# Patient Record
Sex: MALE | Race: WHITE | NOT HISPANIC OR LATINO | Employment: FULL TIME | URBAN - METROPOLITAN AREA
[De-identification: names, ages, dates, MRNs, and addresses within clinical notes are randomized per-mention and may not be internally consistent; named-entity substitution may affect disease eponyms.]

---

## 2023-02-16 ENCOUNTER — APPOINTMENT (OUTPATIENT)
Dept: MEDICAL GROUP | Facility: MEDICAL CENTER | Age: 58
End: 2023-02-16
Payer: OTHER GOVERNMENT

## 2023-02-22 ENCOUNTER — APPOINTMENT (OUTPATIENT)
Dept: MEDICAL GROUP | Facility: MEDICAL CENTER | Age: 58
End: 2023-02-22
Payer: OTHER GOVERNMENT

## 2023-04-13 ENCOUNTER — OFFICE VISIT (OUTPATIENT)
Dept: MEDICAL GROUP | Facility: PHYSICIAN GROUP | Age: 58
End: 2023-04-13
Payer: OTHER GOVERNMENT

## 2023-04-13 VITALS
HEIGHT: 68 IN | SYSTOLIC BLOOD PRESSURE: 110 MMHG | OXYGEN SATURATION: 98 % | DIASTOLIC BLOOD PRESSURE: 54 MMHG | WEIGHT: 194.2 LBS | RESPIRATION RATE: 16 BRPM | BODY MASS INDEX: 29.43 KG/M2 | HEART RATE: 54 BPM | TEMPERATURE: 98 F

## 2023-04-13 DIAGNOSIS — I10 ESSENTIAL HYPERTENSION: ICD-10-CM

## 2023-04-13 DIAGNOSIS — Z23 NEED FOR VACCINATION: ICD-10-CM

## 2023-04-13 DIAGNOSIS — E66.3 OVERWEIGHT: ICD-10-CM

## 2023-04-13 DIAGNOSIS — E11.9 TYPE 2 DIABETES MELLITUS WITHOUT COMPLICATION, WITHOUT LONG-TERM CURRENT USE OF INSULIN (HCC): ICD-10-CM

## 2023-04-13 DIAGNOSIS — G25.81 RESTLESS LEG SYNDROME: ICD-10-CM

## 2023-04-13 DIAGNOSIS — I48.0 PAROXYSMAL A-FIB (HCC): ICD-10-CM

## 2023-04-13 DIAGNOSIS — E78.5 DYSLIPIDEMIA: ICD-10-CM

## 2023-04-13 DIAGNOSIS — Z12.5 SCREENING FOR MALIGNANT NEOPLASM OF PROSTATE: ICD-10-CM

## 2023-04-13 DIAGNOSIS — Z11.59 NEED FOR HEPATITIS C SCREENING TEST: ICD-10-CM

## 2023-04-13 LAB — RETINAL SCREEN: NEGATIVE

## 2023-04-13 PROCEDURE — 92250 FUNDUS PHOTOGRAPHY W/I&R: CPT | Performed by: INTERNAL MEDICINE

## 2023-04-13 PROCEDURE — 90677 PCV20 VACCINE IM: CPT | Performed by: INTERNAL MEDICINE

## 2023-04-13 PROCEDURE — 93000 ELECTROCARDIOGRAM COMPLETE: CPT | Performed by: INTERNAL MEDICINE

## 2023-04-13 PROCEDURE — 99205 OFFICE O/P NEW HI 60 MIN: CPT | Mod: 25 | Performed by: INTERNAL MEDICINE

## 2023-04-13 PROCEDURE — 90471 IMMUNIZATION ADMIN: CPT | Performed by: INTERNAL MEDICINE

## 2023-04-13 RX ORDER — METOPROLOL SUCCINATE 25 MG/1
25 TABLET, EXTENDED RELEASE ORAL DAILY
COMMUNITY
End: 2023-04-25 | Stop reason: SDUPTHER

## 2023-04-13 RX ORDER — LISINOPRIL 10 MG/1
10 TABLET ORAL DAILY
COMMUNITY
End: 2023-04-25 | Stop reason: SDUPTHER

## 2023-04-13 RX ORDER — DILTIAZEM HYDROCHLORIDE 240 MG/1
240 CAPSULE, EXTENDED RELEASE ORAL DAILY
COMMUNITY
Start: 2023-03-09 | End: 2023-04-25 | Stop reason: SDUPTHER

## 2023-04-13 RX ORDER — PIOGLITAZONEHYDROCHLORIDE 30 MG/1
30 TABLET ORAL DAILY
COMMUNITY
Start: 2023-03-09 | End: 2023-04-25 | Stop reason: SDUPTHER

## 2023-04-13 RX ORDER — ROPINIROLE 0.5 MG/1
0.5 TABLET, FILM COATED ORAL 3 TIMES DAILY
COMMUNITY
End: 2023-04-25 | Stop reason: SDUPTHER

## 2023-04-13 ASSESSMENT — PATIENT HEALTH QUESTIONNAIRE - PHQ9: CLINICAL INTERPRETATION OF PHQ2 SCORE: 0

## 2023-04-13 NOTE — ASSESSMENT & PLAN NOTE
Chronic condition.    Body mass index is 29.53 kg/m².     Counseling on health consequences related to obesity.  Discussed with the patient regarding diet, exercise, and lifestyle modification to help achieve and maintain healthy weight

## 2023-04-13 NOTE — ASSESSMENT & PLAN NOTE
This is a chronic condition.  Patient stated that due to elevated liver enzymes statin were not recommended.  Patient currently on diet therapy

## 2023-04-13 NOTE — ASSESSMENT & PLAN NOTE
This is a chronic condition.  The patient is taking aspirin 81 mg daily   Patient currently asymptomatic.  Patient denies chest pain shortness of breath palpitation no near syncope

## 2023-04-13 NOTE — ASSESSMENT & PLAN NOTE
Chronic ongoing condition.  The patient is taking metformin 500 mg twice daily, Actos 30 mg daily.  Patient denies significant hypo or hyperglycemic symptoms.  Patient stated that the last A1c done over a year ago was approximately 7%.  Lab test requested for follow-up.

## 2023-04-13 NOTE — ASSESSMENT & PLAN NOTE
Chronic ongoing condition.  The patient taking Requip 0.5 mg 2 tablet at bedtime and an additional tablet as needed.  Stable

## 2023-04-13 NOTE — ASSESSMENT & PLAN NOTE
Chronic ongoing condition.  The patient taking diltiazem 240 Mg daily lisinopril 10 mg daily.  Metoprolol 25 mg daily.  No significant side effects reported.  Patient reported that her blood pressure has been well controlled at home

## 2023-04-13 NOTE — PROGRESS NOTES
PRIMARY CARE CLINIC VISIT    Chief complaint:  New patient here today to establish care  Follow-up diabetes  Atrial fibrillation  Hypertension  Hyperlipidemia  Overweight  Restless leg syndrome  Request lab tests  Cardiology referral  Immunization update        History of Present Illness     Overweight  Chronic condition.    Body mass index is 29.53 kg/m².     Counseling on health consequences related to obesity.  Discussed with the patient regarding diet, exercise, and lifestyle modification to help achieve and maintain healthy weight          Type 2 diabetes mellitus, without long-term current use of insulin (HCC)  Chronic ongoing condition.  The patient is taking metformin 500 mg twice daily, Actos 30 mg daily.  Patient denies significant hypo or hyperglycemic symptoms.  Patient stated that the last A1c done over a year ago was approximately 7%.  Lab test requested for follow-up.    Paroxysmal A-fib (HCC)  This is a chronic condition.  The patient is taking aspirin 81 mg daily   Patient currently asymptomatic.  Patient denies chest pain shortness of breath palpitation no near syncope    Essential hypertension  Chronic ongoing condition.  The patient taking diltiazem 240 Mg daily lisinopril 10 mg daily.  Metoprolol 25 mg daily.  No significant side effects reported.  Patient reported that her blood pressure has been well controlled at home    Restless leg syndrome  Chronic ongoing condition.  The patient taking Requip 0.5 mg 2 tablet at bedtime and an additional tablet as needed.  Stable    Dyslipidemia  This is a chronic condition.  Patient stated that due to elevated liver enzymes statin were not recommended.  Patient currently on diet therapy    Current Outpatient Medications on File Prior to Visit   Medication Sig Dispense Refill    diltiazem (DILACOR XR) 240 MG XR capsule 240 mg every day.      pioglitazone (ACTOS) 30 MG Tab Take 30 mg by mouth every day.      metFORMIN (GLUCOPHAGE) 500 MG Tab Take 500 mg by  "mouth 2 times a day with meals.      lisinopril (PRINIVIL) 10 MG Tab Take 10 mg by mouth every day.      ROPINIRole (REQUIP) 0.5 MG Tab Take 0.5 mg by mouth 3 times a day.      metoprolol SR (TOPROL XL) 25 MG TABLET SR 24 HR Take 25 mg by mouth every day.      aspirin EC (ECOTRIN) 81 MG Tablet Delayed Response Take 81 mg by mouth every day.       No current facility-administered medications on file prior to visit.        Allergies: Patient has no allergy information on record.    Current Outpatient Medications Ordered in Epic   Medication Sig Dispense Refill    diltiazem (DILACOR XR) 240 MG XR capsule 240 mg every day.      pioglitazone (ACTOS) 30 MG Tab Take 30 mg by mouth every day.      metFORMIN (GLUCOPHAGE) 500 MG Tab Take 500 mg by mouth 2 times a day with meals.      lisinopril (PRINIVIL) 10 MG Tab Take 10 mg by mouth every day.      ROPINIRole (REQUIP) 0.5 MG Tab Take 0.5 mg by mouth 3 times a day.      metoprolol SR (TOPROL XL) 25 MG TABLET SR 24 HR Take 25 mg by mouth every day.      aspirin EC (ECOTRIN) 81 MG Tablet Delayed Response Take 81 mg by mouth every day.       No current [facility]-ordered facility-administered medications on file.       History reviewed. No pertinent past medical history.    Past Surgical History:   Procedure Laterality Date    SHOULDER ARTHROSCOPY Right     TONSILLECTOMY         Family History   Problem Relation Age of Onset    Hypertension Mother        Social History     Tobacco Use   Smoking Status Former    Types: Cigarettes   Smokeless Tobacco Never       Social History     Substance and Sexual Activity   Alcohol Use Yes       Review of systems.  As per HPI above. All other systems reviewed and negative.      Past Medical, Social, and Family history reviewed and updated in EPIC     Objective     /54 (BP Location: Left arm, Patient Position: Sitting, BP Cuff Size: Large adult)   Pulse (!) 54   Temp 36.7 °C (98 °F) (Temporal)   Resp 16   Ht 1.727 m (5' 8\")   Wt 88.1 kg " (194 lb 3.2 oz)   SpO2 98%    Body mass index is 29.53 kg/m².    General: alert in no apparent distress.  Cardiovascular: regular rate and rhythm  Pulmonary: lungs : no wheezing   Gastrointestinal: BS present. No obvious mass noted  Monofilament testing with a 10 gram force: sensation intact: intact bilaterally  Visual Inspection: Feet without maceration, ulcers, fissures.  Pedal pulses: decreased bilaterally       Assessment and Plan     1. Type 2 diabetes mellitus without complication, without long-term current use of insulin (HCC)  Chronic condition.  Current control unclear.  Lab test including A1c requested.  Recommend follow-up after lab test done.  Patient will continue with metformin 5 mg twice daily Actos 30 mg daily.  A1c goal of 7% discussed.  Basic physiology of Diabetes was explained to patient as well as possible microvascular/macrovascular complications.  Pt's education:   The importance of increasing physical activity to improve diabetes control was discussed with the patient.   Patient was also educated on changing diet and making better choices to help control blood sugar.   Discussed FBG goal of 100-120, 2hr PP <180       - HEMOGLOBIN A1C; Future  - Basic Metabolic Panel; Future  - MICROALBUMIN CREAT RATIO URINE; Future  - POCT Retinal Eye Exam    2. Paroxysmal A-fib (HCC)  - I independently interpreted the patient's ekg: This rhythm.  Rate 60.  Borderline low voltage otherwise normal EKG.  Result discussed with the patient.  - REFERRAL TO CARDIOLOGY  Chronic condition.  Patient is medically stable.  Continue with metoprolol 25 mg daily and diltiazem 240 Mg daily.  Patient reported that previous cardiologist did not recommend anticoagulation therapy.  He has been taking aspirin 81 mg daily.  We will refer the patient to cardiology for ongoing follow-up  3. Dyslipidemia  - Lipid Profile; Future  - HEPATIC FUNCTION PANEL; Future    4. Essential hypertension  - CBC WITHOUT DIFFERENTIAL; Future    5.  Overweight    6. Restless leg syndrome    7. Need for hepatitis C screening test  - HEP C VIRUS ANTIBODY; Future    8. Screening for malignant neoplasm of prostate  - PROSTATE SPECIFIC AG SCREENING; Future    9. Need for vaccination  - Pneumococcal Conjugate Vaccine 20-Valent (19 yrs+)    Other orders  - diltiazem (DILACOR XR) 240 MG XR capsule; 240 mg every day.  - pioglitazone (ACTOS) 30 MG Tab; Take 30 mg by mouth every day.  - metFORMIN (GLUCOPHAGE) 500 MG Tab; Take 500 mg by mouth 2 times a day with meals.  - lisinopril (PRINIVIL) 10 MG Tab; Take 10 mg by mouth every day.  - aspirin EC (ECOTRIN) 81 MG Tablet Delayed Response; Take 81 mg by mouth every day.  - ROPINIRole (REQUIP) 0.5 MG Tab; Take 0.5 mg by mouth 3 times a day.  - metoprolol SR (TOPROL XL) 25 MG TABLET SR 24 HR; Take 25 mg by mouth every day.      Attestation: I spent:  63   min -  That includes time for chart review before the visit, the actual patient visit, and time spent on documentation in EMR after the visit.  Chart review/prep, review of other providers' records, imaging/lab review, face-to-face time for history/examination, ordering, prescribing,  review of results/meds/ treatment plan with patient, and care coordination.    The patient is of extensive complexity. This pt is at high risk for complications and morbidity.                 Healthcare Maintenance       Health Maintenance Due   Topic Date Due    IMM HEP B VACCINE (1 of 3 - 3-dose series) Never done    HEPATITIS C SCREENING  Never done    A1C SCREENING  Never done    FASTING LIPID PROFILE  Never done    URINE ACR / MICROALBUMIN  Never done    IMM PNEUMOCOCCAL VACCINE: 0-64 Years (1 - PCV) Never done    RETINAL SCREENING  Never done    SERUM CREATININE  Never done    IMM DTaP/Tdap/Td Vaccine (1 - Tdap) Never done    DIABETES MONOFILAMENT / LE EXAM  06/13/2019               Please note that this dictation was created using voice recognition software. I have made every reasonable  attempt to correct obvious errors, but I expect that there are errors of grammar and possibly content that I did not discover before finalizing the note.    Ryan Garnica MD  Internal Medicine  [facility]

## 2023-04-19 ENCOUNTER — TELEPHONE (OUTPATIENT)
Dept: HEALTH INFORMATION MANAGEMENT | Facility: OTHER | Age: 58
End: 2023-04-19
Payer: OTHER GOVERNMENT

## 2023-04-20 ENCOUNTER — OFFICE VISIT (OUTPATIENT)
Dept: CARDIOLOGY | Facility: MEDICAL CENTER | Age: 58
End: 2023-04-20
Attending: INTERNAL MEDICINE
Payer: OTHER GOVERNMENT

## 2023-04-20 VITALS
BODY MASS INDEX: 29.58 KG/M2 | SYSTOLIC BLOOD PRESSURE: 124 MMHG | OXYGEN SATURATION: 98 % | DIASTOLIC BLOOD PRESSURE: 70 MMHG | HEIGHT: 68 IN | HEART RATE: 89 BPM | WEIGHT: 195.16 LBS | RESPIRATION RATE: 16 BRPM

## 2023-04-20 DIAGNOSIS — I10 ESSENTIAL HYPERTENSION: ICD-10-CM

## 2023-04-20 DIAGNOSIS — I48.0 PAROXYSMAL A-FIB (HCC): ICD-10-CM

## 2023-04-20 DIAGNOSIS — E11.9 TYPE 2 DIABETES MELLITUS WITHOUT COMPLICATION, WITHOUT LONG-TERM CURRENT USE OF INSULIN (HCC): ICD-10-CM

## 2023-04-20 PROCEDURE — 99204 OFFICE O/P NEW MOD 45 MIN: CPT | Performed by: STUDENT IN AN ORGANIZED HEALTH CARE EDUCATION/TRAINING PROGRAM

## 2023-04-20 ASSESSMENT — ENCOUNTER SYMPTOMS
PND: 0
DIARRHEA: 0
DYSPNEA ON EXERTION: 0
NIGHT SWEATS: 0
VOMITING: 0
FEVER: 0
WEAKNESS: 0
FOCAL WEAKNESS: 0
SYNCOPE: 0
NAUSEA: 0
NEAR-SYNCOPE: 0
ORTHOPNEA: 0
WHEEZING: 0
ABDOMINAL PAIN: 0
SHORTNESS OF BREATH: 0
COUGH: 0
DIZZINESS: 0
PALPITATIONS: 0
IRREGULAR HEARTBEAT: 0

## 2023-04-20 NOTE — PROGRESS NOTES
Cardiology Initial Consultation Note    Date of note:    4/20/2023    Primary Care Provider: Ryan Garnica M.D.  Referring Provider: Ryan Garnica M.D.     Patient Name: Arben Torres     YOB: 1965  MRN:              [id number]    Chief Complaint: Atrial fibrillation    History of Present Illness: Mr. Arben Torres is a 58 y.o. male whose current medical problems include atrial fibrillation, hypertension, dyslipidemia, and DM who is here for cardiac consultation for atrial fibrillation.      The patient presents today to establish care. The patient presents with his wife.  The patient reports that about five years ago, he was diagnosed with afib.  He woke up with palpitations, and went to the ER, and received diltiazem with conversion to sinus rhythm. The patient reports that he followed with a cardiologist and had been taking diltiazem and metoprolol since without any recurrent symptoms.  He reports feeling very well today. He is active, walks daily and exercises without any chest pain or shortness of breath. No orthopnea, PND, or leg swelling.  No palpitations.  No syncope or presyncopal episodes.     Cardiovascular Risk Factors:  1. Smoking status: Former smoker  2. Type II Diabetes Mellitus: On medication   3. Hypertension: On medication  4. Dyslipidemia: None/not recently checked.   5. Family history of early Coronary Artery Disease in a first degree relative (Male less than 55 years of age; Female less than 65 years of age): None  6.  Obesity and/or Metabolic Syndrome: Body mass index is 29.67 kg/m².  7. Sedentary lifestyle: Active    Review of Systems   Constitutional: Negative for fever, malaise/fatigue and night sweats.   Cardiovascular:  Negative for chest pain, dyspnea on exertion, irregular heartbeat, leg swelling, near-syncope, orthopnea, palpitations, paroxysmal nocturnal dyspnea and syncope.   Respiratory:  Negative for cough, shortness of breath and wheezing.   "  Gastrointestinal:  Negative for abdominal pain, diarrhea, nausea and vomiting.   Neurological:  Negative for dizziness, focal weakness and weakness.       All other systems reviewed and are negative.       Current Outpatient Medications   Medication Sig Dispense Refill    diltiazem (DILACOR XR) 240 MG XR capsule 240 mg every day.      pioglitazone (ACTOS) 30 MG Tab Take 30 mg by mouth every day.      metFORMIN (GLUCOPHAGE) 500 MG Tab Take 500 mg by mouth 2 times a day with meals.      lisinopril (PRINIVIL) 10 MG Tab Take 10 mg by mouth every day.      aspirin EC (ECOTRIN) 81 MG Tablet Delayed Response Take 81 mg by mouth every day.      ROPINIRole (REQUIP) 0.5 MG Tab Take 0.5 mg by mouth 3 times a day.      metoprolol SR (TOPROL XL) 25 MG TABLET SR 24 HR Take 25 mg by mouth every day.       No current facility-administered medications for this visit.         No Known Allergies      Physical Exam:  Ambulatory Vitals  /70 (BP Location: Left arm, Patient Position: Sitting, BP Cuff Size: Adult)   Pulse 89   Resp 16   Ht 1.727 m (5' 8\")   Wt 88.5 kg (195 lb 2.6 oz)   SpO2 98%    Oxygen Therapy:  Pulse Oximetry: 98 %  BP Readings from Last 4 Encounters:   04/20/23 124/70   04/13/23 110/54       Weight/BMI: Body mass index is 29.67 kg/m².  Wt Readings from Last 4 Encounters:   04/20/23 88.5 kg (195 lb 2.6 oz)   04/13/23 88.1 kg (194 lb 3.2 oz)         General: Well appearing and in no apparent distress  Eyes: nl conjunctiva, no icteric sclera  ENT: wearing a mask, normal external appearance of ears  Neck: no visible JVP,  no carotid bruits  Lungs: normal respiratory effort, CTAB  Heart: RRR, no murmurs, no rubs or gallops,  no edema bilateral lower extremities. No LV/RV heave on cardiac palpatation. + bilateral radial pulses.  + bilateral dp pulses.   Abdomen: soft, non tender, non distended, no masses, normal bowel sounds.  No HSM.  Extremities/MSK: no clubbing, no cyanosis  Neurological: No focal sensory " deficits  Psychiatric: Appropriate affect, A/O x 3, intact judgement and insight  Skin: Warm extremities      Lab Data Review:  No results found for: CHOLSTRLTOT, LDL, HDL, TRIGLYCERIDE    No results found for: SODIUM, POTASSIUM, CHLORIDE, CO2, GLUCOSE, BUN, CREATININE, BUNCREATRAT, GLOMRATE  No results found for: ALKPHOSPHAT, ASTSGOT, ALTSGPT, TBILIRUBIN   No results found for: WBC  No results found for: HBA1C      Cardiac Imaging and Procedures Review:    EKG dated 4/13/2023: My personal interpretation is sinus rhythm, borderline low voltage in extremity leads    No prior echocardiogram      Assessment & Plan     1. Essential hypertension  EC-ECHOCARDIOGRAM COMPLETE W/O CONT      2. Paroxysmal A-fib (HCC)  EC-ECHOCARDIOGRAM COMPLETE W/O CONT      3. Type 2 diabetes mellitus without complication, without long-term current use of insulin (HCC)              Shared Medical Decision Making:    Paroxysmal atrial fibrillation  [id number] at least 2 (HTN, DM) - recommend Eliquis  -Discussed starting Eliquis. The patient had bleeding issues in the past, and would like to continue on aspirin 81mg daily.  Counseled on diet and exercise to modify the risk factors of HTN and DM.   -Continue aspirin 81mg daily  -Continue metoprolol 25mg daily and diltiazem 240mg daily  -Obtain echocardiogram to assess LVEF and any structural abnormalities.     Hypertension  BP well controlled this visit  -Continue metoprolol and diltiazem as above  -Continue lisinopril 10mg daily    DM  -Continue metformin  -Continue lisinopril as above  -pending lipid panel    All of the patient's excellent questions were answered to the best of my knowledge and to his satisfaction.  It was a pleasure seeing Mr. Arben Torres in my clinic today. Return in about 6 months (around 10/20/2023). Patient is aware to call the cardiology clinic with any questions or concerns.      Donna Holt MD  [facility]  [place] [facility], [facility]  [address] [address]  [place] [address]  Phone: [phone]  Fax: [phone]

## 2023-04-25 ENCOUNTER — PATIENT MESSAGE (OUTPATIENT)
Dept: MEDICAL GROUP | Facility: PHYSICIAN GROUP | Age: 58
End: 2023-04-25
Payer: OTHER GOVERNMENT

## 2023-04-25 RX ORDER — ROPINIROLE 0.5 MG/1
0.5 TABLET, FILM COATED ORAL 3 TIMES DAILY
Qty: 270 TABLET | Refills: 3 | Status: SHIPPED | OUTPATIENT
Start: 2023-04-25

## 2023-04-25 RX ORDER — LISINOPRIL 10 MG/1
10 TABLET ORAL DAILY
Qty: 90 TABLET | Refills: 3 | Status: SHIPPED | OUTPATIENT
Start: 2023-04-25

## 2023-04-25 RX ORDER — PIOGLITAZONEHYDROCHLORIDE 30 MG/1
30 TABLET ORAL DAILY
Qty: 90 TABLET | Refills: 3 | Status: SHIPPED | OUTPATIENT
Start: 2023-04-25

## 2023-04-25 RX ORDER — DILTIAZEM HYDROCHLORIDE 240 MG/1
240 CAPSULE, EXTENDED RELEASE ORAL DAILY
Qty: 90 CAPSULE | Refills: 3 | Status: SHIPPED | OUTPATIENT
Start: 2023-04-25 | End: 2023-07-05 | Stop reason: SDUPTHER

## 2023-04-25 RX ORDER — METOPROLOL SUCCINATE 25 MG/1
25 TABLET, EXTENDED RELEASE ORAL DAILY
Qty: 90 TABLET | Refills: 3 | Status: SHIPPED | OUTPATIENT
Start: 2023-04-25

## 2023-06-02 ENCOUNTER — HOSPITAL ENCOUNTER (OUTPATIENT)
Dept: LAB | Facility: MEDICAL CENTER | Age: 58
End: 2023-06-02
Attending: INTERNAL MEDICINE
Payer: OTHER GOVERNMENT

## 2023-06-02 DIAGNOSIS — I10 ESSENTIAL HYPERTENSION: ICD-10-CM

## 2023-06-02 DIAGNOSIS — E78.5 DYSLIPIDEMIA: ICD-10-CM

## 2023-06-02 DIAGNOSIS — E11.9 TYPE 2 DIABETES MELLITUS WITHOUT COMPLICATION, WITHOUT LONG-TERM CURRENT USE OF INSULIN (HCC): ICD-10-CM

## 2023-06-02 DIAGNOSIS — Z12.5 SCREENING FOR MALIGNANT NEOPLASM OF PROSTATE: ICD-10-CM

## 2023-06-02 DIAGNOSIS — Z11.59 NEED FOR HEPATITIS C SCREENING TEST: ICD-10-CM

## 2023-06-02 LAB
ALBUMIN SERPL BCP-MCNC: 4.6 G/DL (ref 3.2–4.9)
ALP SERPL-CCNC: 71 U/L (ref 30–99)
ALT SERPL-CCNC: 39 U/L (ref 2–50)
ANION GAP SERPL CALC-SCNC: 16 MMOL/L (ref 7–16)
AST SERPL-CCNC: 58 U/L (ref 12–45)
BILIRUB CONJ SERPL-MCNC: <0.2 MG/DL (ref 0.1–0.5)
BILIRUB INDIRECT SERPL-MCNC: ABNORMAL MG/DL (ref 0–1)
BILIRUB SERPL-MCNC: 0.6 MG/DL (ref 0.1–1.5)
BUN SERPL-MCNC: 11 MG/DL (ref 8–22)
CALCIUM SERPL-MCNC: 9.9 MG/DL (ref 8.4–10.2)
CHLORIDE SERPL-SCNC: 96 MMOL/L (ref 96–112)
CHOLEST SERPL-MCNC: 225 MG/DL (ref 100–199)
CO2 SERPL-SCNC: 24 MMOL/L (ref 20–33)
CREAT SERPL-MCNC: 1.05 MG/DL (ref 0.5–1.4)
ERYTHROCYTE [DISTWIDTH] IN BLOOD BY AUTOMATED COUNT: 52.3 FL (ref 35.9–50)
FASTING STATUS PATIENT QL REPORTED: NORMAL
GFR SERPLBLD CREATININE-BSD FMLA CKD-EPI: 82 ML/MIN/1.73 M 2
GLUCOSE SERPL-MCNC: 112 MG/DL (ref 65–99)
HCT VFR BLD AUTO: 44.2 % (ref 42–52)
HCV AB SER QL: NORMAL
HDLC SERPL-MCNC: 74 MG/DL
HGB BLD-MCNC: 14.8 G/DL (ref 14–18)
LDLC SERPL CALC-MCNC: 127 MG/DL
MCH RBC QN AUTO: 34.8 PG (ref 27–33)
MCHC RBC AUTO-ENTMCNC: 33.5 G/DL (ref 32.3–36.5)
MCV RBC AUTO: 104 FL (ref 81.4–97.8)
PLATELET # BLD AUTO: 142 K/UL (ref 164–446)
PMV BLD AUTO: 9.9 FL (ref 9–12.9)
POTASSIUM SERPL-SCNC: 4.6 MMOL/L (ref 3.6–5.5)
PROT SERPL-MCNC: 8.4 G/DL (ref 6–8.2)
PSA SERPL-MCNC: 0.56 NG/ML (ref 0–4)
RBC # BLD AUTO: 4.25 M/UL (ref 4.7–6.1)
SODIUM SERPL-SCNC: 136 MMOL/L (ref 135–145)
TRIGL SERPL-MCNC: 118 MG/DL (ref 0–149)
WBC # BLD AUTO: 6.3 K/UL (ref 4.8–10.8)

## 2023-06-02 PROCEDURE — 80048 BASIC METABOLIC PNL TOTAL CA: CPT

## 2023-06-02 PROCEDURE — 85027 COMPLETE CBC AUTOMATED: CPT

## 2023-06-02 PROCEDURE — 80061 LIPID PANEL: CPT

## 2023-06-02 PROCEDURE — 82043 UR ALBUMIN QUANTITATIVE: CPT

## 2023-06-02 PROCEDURE — 82570 ASSAY OF URINE CREATININE: CPT

## 2023-06-02 PROCEDURE — 83036 HEMOGLOBIN GLYCOSYLATED A1C: CPT

## 2023-06-02 PROCEDURE — 80076 HEPATIC FUNCTION PANEL: CPT

## 2023-06-02 PROCEDURE — 36415 COLL VENOUS BLD VENIPUNCTURE: CPT

## 2023-06-02 PROCEDURE — 84153 ASSAY OF PSA TOTAL: CPT

## 2023-06-02 PROCEDURE — 86803 HEPATITIS C AB TEST: CPT

## 2023-06-03 DIAGNOSIS — R74.8 LIVER ENZYME ELEVATION: ICD-10-CM

## 2023-06-03 DIAGNOSIS — E88.09 HYPERPROTEINEMIA: ICD-10-CM

## 2023-06-03 LAB
CREAT UR-MCNC: 105.87 MG/DL
EST. AVERAGE GLUCOSE BLD GHB EST-MCNC: 111 MG/DL
HBA1C MFR BLD: 5.5 % (ref 4–5.6)
MICROALBUMIN UR-MCNC: <1.2 MG/DL
MICROALBUMIN/CREAT UR: NORMAL MG/G (ref 0–30)

## 2023-06-03 RX ORDER — ATORVASTATIN CALCIUM 20 MG/1
20 TABLET, FILM COATED ORAL DAILY
Qty: 90 TABLET | Refills: 3 | Status: SHIPPED | OUTPATIENT
Start: 2023-06-03

## 2023-06-12 ENCOUNTER — PATIENT MESSAGE (OUTPATIENT)
Dept: MEDICAL GROUP | Facility: PHYSICIAN GROUP | Age: 58
End: 2023-06-12
Payer: OTHER GOVERNMENT

## 2023-06-12 DIAGNOSIS — E11.9 TYPE 2 DIABETES MELLITUS WITHOUT COMPLICATION, WITHOUT LONG-TERM CURRENT USE OF INSULIN (HCC): ICD-10-CM

## 2023-06-12 DIAGNOSIS — Z83.518 FAMILY HISTORY OF MACULAR DEGENERATION: ICD-10-CM

## 2023-06-13 NOTE — PATIENT COMMUNICATION
1. Caller Name: Arben Ayala Back Number: [phone] (home)         How would the patient prefer to be contacted with a response: Phone call do NOT leave a detailed message    2. SPECIFIC Action To Be Taken: Referral pending, please sign.    3. Diagnosis/Clinical Reason for Request:   Type 2 diabetes mellitus without complication, without long-term current use of insulin (HCC) Family history of macular degeneration        4. Specialty & Provider Name/Lab/Imaging Location:none    5. Is appointment scheduled for requested order/referral: no    Patient was informed they will receive a return phone call from the office ONLY if there are any questions before processing their request. Advised to call back if they haven't received a call from the referral department in 5 days.

## 2023-06-30 ENCOUNTER — APPOINTMENT (OUTPATIENT)
Dept: RADIOLOGY | Facility: MEDICAL CENTER | Age: 58
End: 2023-06-30
Attending: INTERNAL MEDICINE
Payer: OTHER GOVERNMENT

## 2023-06-30 DIAGNOSIS — R74.8 LIVER ENZYME ELEVATION: ICD-10-CM

## 2023-06-30 PROCEDURE — 76705 ECHO EXAM OF ABDOMEN: CPT

## 2023-07-05 RX ORDER — DILTIAZEM HYDROCHLORIDE 240 MG/1
240 CAPSULE, EXTENDED RELEASE ORAL DAILY
Qty: 90 CAPSULE | Refills: 3 | Status: SHIPPED | OUTPATIENT
Start: 2023-07-05 | End: 2023-07-10

## 2023-07-10 RX ORDER — DILTIAZEM HYDROCHLORIDE 240 MG/1
240 CAPSULE, COATED, EXTENDED RELEASE ORAL DAILY
Qty: 90 CAPSULE | Refills: 3 | Status: SHIPPED | OUTPATIENT
Start: 2023-07-10

## 2023-08-29 ENCOUNTER — APPOINTMENT (OUTPATIENT)
Dept: CARDIOLOGY | Facility: MEDICAL CENTER | Age: 58
End: 2023-08-29
Attending: STUDENT IN AN ORGANIZED HEALTH CARE EDUCATION/TRAINING PROGRAM
Payer: OTHER GOVERNMENT

## 2023-10-03 ENCOUNTER — APPOINTMENT (OUTPATIENT)
Dept: MEDICAL GROUP | Facility: PHYSICIAN GROUP | Age: 58
End: 2023-10-03
Payer: OTHER GOVERNMENT

## 2024-01-30 ENCOUNTER — APPOINTMENT (OUTPATIENT)
Dept: MEDICAL GROUP | Facility: PHYSICIAN GROUP | Age: 59
End: 2024-01-30
Payer: OTHER GOVERNMENT

## 2024-02-20 ENCOUNTER — APPOINTMENT (OUTPATIENT)
Dept: MEDICAL GROUP | Facility: PHYSICIAN GROUP | Age: 59
End: 2024-02-20
Payer: OTHER GOVERNMENT

## 2024-02-21 ENCOUNTER — APPOINTMENT (OUTPATIENT)
Dept: MEDICAL GROUP | Facility: PHYSICIAN GROUP | Age: 59
End: 2024-02-21
Payer: OTHER GOVERNMENT

## 2024-03-13 ENCOUNTER — APPOINTMENT (OUTPATIENT)
Dept: MEDICAL GROUP | Facility: PHYSICIAN GROUP | Age: 59
End: 2024-03-13
Payer: OTHER GOVERNMENT

## 2024-04-18 ENCOUNTER — OFFICE VISIT (OUTPATIENT)
Dept: MEDICAL GROUP | Facility: PHYSICIAN GROUP | Age: 59
End: 2024-04-18
Payer: COMMERCIAL

## 2024-04-18 VITALS
TEMPERATURE: 98.3 F | SYSTOLIC BLOOD PRESSURE: 138 MMHG | HEIGHT: 68 IN | WEIGHT: 178 LBS | OXYGEN SATURATION: 96 % | HEART RATE: 98 BPM | DIASTOLIC BLOOD PRESSURE: 80 MMHG | RESPIRATION RATE: 16 BRPM | BODY MASS INDEX: 26.98 KG/M2

## 2024-04-18 DIAGNOSIS — E66.3 OVERWEIGHT: ICD-10-CM

## 2024-04-18 DIAGNOSIS — K62.5 RECTAL BLEEDING: Primary | ICD-10-CM

## 2024-04-18 DIAGNOSIS — E11.9 TYPE 2 DIABETES MELLITUS WITHOUT COMPLICATION, WITHOUT LONG-TERM CURRENT USE OF INSULIN (HCC): ICD-10-CM

## 2024-04-18 DIAGNOSIS — K64.9 HEMORRHOIDS, UNSPECIFIED HEMORRHOID TYPE: ICD-10-CM

## 2024-04-18 DIAGNOSIS — I10 ESSENTIAL HYPERTENSION: ICD-10-CM

## 2024-04-18 DIAGNOSIS — S01.512A LACERATION OF TONGUE, INITIAL ENCOUNTER: ICD-10-CM

## 2024-04-18 DIAGNOSIS — I48.0 PAROXYSMAL A-FIB (HCC): ICD-10-CM

## 2024-04-18 DIAGNOSIS — E88.09 HYPERPROTEINEMIA: ICD-10-CM

## 2024-04-18 DIAGNOSIS — E78.5 DYSLIPIDEMIA: ICD-10-CM

## 2024-04-18 LAB
HBA1C MFR BLD: 5.5 % (ref ?–5.8)
POCT INT CON NEG: NEGATIVE
POCT INT CON POS: POSITIVE

## 2024-04-18 PROCEDURE — 83036 HEMOGLOBIN GLYCOSYLATED A1C: CPT | Performed by: INTERNAL MEDICINE

## 2024-04-18 PROCEDURE — 3075F SYST BP GE 130 - 139MM HG: CPT | Performed by: INTERNAL MEDICINE

## 2024-04-18 PROCEDURE — 3079F DIAST BP 80-89 MM HG: CPT | Performed by: INTERNAL MEDICINE

## 2024-04-18 PROCEDURE — 92250 FUNDUS PHOTOGRAPHY W/I&R: CPT | Mod: 26 | Performed by: INTERNAL MEDICINE

## 2024-04-18 PROCEDURE — 99214 OFFICE O/P EST MOD 30 MIN: CPT | Performed by: INTERNAL MEDICINE

## 2024-04-18 RX ORDER — GABAPENTIN 100 MG/1
100 CAPSULE ORAL
COMMUNITY

## 2024-04-18 RX ORDER — MULTIVITAMIN
1 TABLET ORAL DAILY
COMMUNITY

## 2024-04-18 ASSESSMENT — PATIENT HEALTH QUESTIONNAIRE - PHQ9: CLINICAL INTERPRETATION OF PHQ2 SCORE: 0

## 2024-04-18 ASSESSMENT — FIBROSIS 4 INDEX: FIB4 SCORE: 3.86

## 2024-04-18 NOTE — ASSESSMENT & PLAN NOTE
Chronic condition.  The patient taking atorvastatin 20 Mg daily.  Patient tolerating medication well.

## 2024-04-18 NOTE — ASSESSMENT & PLAN NOTE
"This is a new condition.  Patient reported that he was at he was at airport in [place] earlier this month patient fell and the patient had tongue laceration as a result of his teeth cutting into the tongue  Patient was not done.  The patient was noted with moderate amount of swelling and infection.  The patient was treated antibiotic Cipro and \"some type of penicillin.    Patient still noted with moderate amount of pain and some difficulty with chewing/eating  Reported the swelling has gone down since her last couple days.  Patient denies fever or chills.  No difficulty with shortness of breath or breathing.  Denies chest pain  "

## 2024-04-18 NOTE — PROGRESS NOTES
"PRIMARY CARE CLINIC VISIT        Chief Complaint   Patient presents with    Hospital Follow-up     [place] and bit tongue and hit chin .     Hemorrhoids     Has had since April 14th      Hosp followup  Tongue laceration  Hemorrhoids  Follow-up diabetes  Atrial fibrillation  Hypertension  Hyperlipidemia  Hypoproteinemia          History of Present Illness     Type 2 diabetes mellitus, without long-term current use of insulin (HCC)  Chronic condition.  The patient presently taking metformin 500 mg p.o. twice daily.  The patient tolerating medication well.  Patient is due for lab test.    Paroxysmal A-fib (HCC)  This is a chronic condition.  Patient has been followed by cardiology service.  Currently taking aspirin 81 Mg daily.  Diltiazem to 40 Mg daily and metoprolol 25 mg daily.    Essential hypertension  This is a chronic condition.  The patient is taking diltiazem and lisinopril as well as metoprolol.  Blood pressure has been well-controlled.  No significant side effects reported.    Overweight  Chronic condition.  Discussed with the patient regarding diet, exercise, and lifestyle modification to help achieve and maintain healthy weight         Dyslipidemia  Chronic condition.  The patient taking atorvastatin 20 Mg daily.  Patient tolerating medication well.    Hyperproteinemia  Chronic condition.  Noted with chart review.  Patient asymptomatic.  Recommend lab test to be done for follow-up including SPEP    Tongue laceration  This is a new condition.  Patient reported that he was at he was at airport in [place] earlier this month patient fell and the patient had tongue laceration as a result of his teeth cutting into the tongue  Patient was not done.  The patient was noted with moderate amount of swelling and infection.  The patient was treated antibiotic Cipro and \"some type of penicillin.    Patient still noted with moderate amount of pain and some difficulty with chewing/eating  Reported the swelling " has gone down since her last couple days.  Patient denies fever or chills.  No difficulty with shortness of breath or breathing.  Denies chest pain    Hemorrhoids  This is a new condition.  The patient reported large hemorrhoid causing intermittent bleeding.  The patient denies any recent change in his diet or medication.  Patient has been doing sitz bath and applying hemorrhoidal cream with some improvement.    Current Outpatient Medications on File Prior to Visit   Medication Sig Dispense Refill    carbidopa-levodopa (SINEMET)  MG Tab Take 1 Tablet by mouth every evening.      Cholecalciferol 2000 UNIT Tab Take 2,000 Units by mouth every day.      gabapentin (NEURONTIN) 100 MG Cap Take 100 mg by mouth.      Multiple Vitamin (DAILY VITES) Tab Take 1 Tablet by mouth every day.      dilTIAZem CD (CARDIZEM CD) 240 MG CAPSULE SR 24 HR Take 1 Capsule by mouth every day. 90 Capsule 3    atorvastatin (LIPITOR) 20 MG Tab Take 1 Tablet by mouth every day. 90 Tablet 3    aspirin EC (ECOTRIN) 81 MG Tablet Delayed Response Take 1 Tablet by mouth every day. 90 Tablet 3    lisinopril (PRINIVIL) 10 MG Tab Take 1 Tablet by mouth every day. 90 Tablet 3    metFORMIN (GLUCOPHAGE) 500 MG Tab Take 1 Tablet by mouth 2 times a day with meals. 180 Tablet 3    metoprolol SR (TOPROL XL) 25 MG TABLET SR 24 HR Take 1 Tablet by mouth every day. 90 Tablet 3    pioglitazone (ACTOS) 30 MG Tab Take 1 Tablet by mouth every day. 90 Tablet 3    ROPINIRole (REQUIP) 0.5 MG Tab Take 1 Tablet by mouth 3 times a day. 270 Tablet 3     No current facility-administered medications on file prior to visit.        Allergies: Patient has no known allergies.    Current Outpatient Medications Ordered in Epic   Medication Sig Dispense Refill    carbidopa-levodopa (SINEMET)  MG Tab Take 1 Tablet by mouth every evening.      Cholecalciferol 2000 UNIT Tab Take 2,000 Units by mouth every day.      gabapentin (NEURONTIN) 100 MG Cap Take 100 mg by mouth.       Multiple Vitamin (DAILY VITES) Tab Take 1 Tablet by mouth every day.      dilTIAZem CD (CARDIZEM CD) 240 MG CAPSULE SR 24 HR Take 1 Capsule by mouth every day. 90 Capsule 3    atorvastatin (LIPITOR) 20 MG Tab Take 1 Tablet by mouth every day. 90 Tablet 3    aspirin EC (ECOTRIN) 81 MG Tablet Delayed Response Take 1 Tablet by mouth every day. 90 Tablet 3    lisinopril (PRINIVIL) 10 MG Tab Take 1 Tablet by mouth every day. 90 Tablet 3    metFORMIN (GLUCOPHAGE) 500 MG Tab Take 1 Tablet by mouth 2 times a day with meals. 180 Tablet 3    metoprolol SR (TOPROL XL) 25 MG TABLET SR 24 HR Take 1 Tablet by mouth every day. 90 Tablet 3    pioglitazone (ACTOS) 30 MG Tab Take 1 Tablet by mouth every day. 90 Tablet 3    ROPINIRole (REQUIP) 0.5 MG Tab Take 1 Tablet by mouth 3 times a day. 270 Tablet 3     No current Epic-ordered facility-administered medications on file.       History reviewed. No pertinent past medical history.    Past Surgical History:   Procedure Laterality Date    SHOULDER ARTHROSCOPY Right     TONSILLECTOMY         Family History   Problem Relation Age of Onset    Hypertension Mother        Social History     Tobacco Use   Smoking Status Former    Types: Cigarettes   Smokeless Tobacco Never       Social History     Substance and Sexual Activity   Alcohol Use Yes       Review of systems  As per HPI above. All other systems reviewed and negative.      Past Medical, Social, and Family history reviewed and updated in [facility]       LAB DATA:    Lab Results   Component Value Date/Time    HBA1C 5.5 04/18/2024 01:14 PM    HBA1C 5.5 06/02/2023 08:53 AM       Lab Results   Component Value Date/Time    WBC 6.3 06/02/2023 08:53 AM    HEMOGLOBIN 14.8 06/02/2023 08:53 AM    HEMATOCRIT 44.2 06/02/2023 08:53 AM    .0 (H) 06/02/2023 08:53 AM    PLATELETCT 142 (L) 06/02/2023 08:53 AM       Lab Results   Component Value Date/Time    SODIUM 136 06/02/2023 08:53 AM    POTASSIUM 4.6 06/02/2023 08:53 AM    GLUCOSE 112 (H)  "06/02/2023 08:53 AM    BUN 11 06/02/2023 08:53 AM    CREATININE 1.05 06/02/2023 08:53 AM       Lab Results   Component Value Date/Time    CHOLSTRLTOT 225 (H) 06/02/2023 08:53 AM    TRIGLYCERIDE 118 06/02/2023 08:53 AM    HDL 74 06/02/2023 08:53 AM     (H) 06/02/2023 08:53 AM       Lab Results   Component Value Date/Time    ALTSGPT 39 06/02/2023 08:53 AM          Objective     /80   Pulse 98   Temp 36.8 °C (98.3 °F) (Temporal)   Resp 16   Ht 1.727 m (5' 8\")   Wt 80.7 kg (178 lb)   SpO2 96%    Body mass index is 27.06 kg/m².    General: alert in no apparent distress.  Cardiovascular: regular rate and rhythm  Pulmonary: lungs : no wheezing   Gastrointestinal: BS present.   Monofilament testing with a 10 gram force: sensation intact: intact bilaterally  Visual Inspection: Feet without maceration, ulcers, fissures.  Pedal pulses: intact bilaterally     Tongue with no active bleeding.  The laceration appears to have healed at this time.  Mild swelling still noted left lateral aspect of the tongue.  There is no fluctuance or discharge noted.    Rectal with large external hemorrhoid.  Very minimal bleeding noted at this time.    Assessment and Plan     1. Laceration of tongue, initial encounter  This is a new condition.  - Referral to ENT  Advised the patient to complete the 2 antibiotic prescription that was given.    2. Hemorrhoids, unspecified hemorrhoid type  Chronic worsening condition.  Advised increasing fluid and fiber intake.  Sitz bath recommended.  - Referral to Gastroenterology  - Referral to General Surgery    3. Type 2 diabetes mellitus without complication, without long-term current use of insulin (HCC)  Chronic stable condition.  A1c today 5.5%.  Continue with metformin 500 Mg twice daily and Actos 30 Mg daily.  Discussed with the patient goals for Diabetes management:  -HbA1C < 7% /  Fasting BG   /  2 hrs postprandial  - 180    Pt's education:   -The importance of increasing " physical activity to improve diabetes control  discussed with the patient.   -Patient was also educated on changing diet and making better choices to help control blood sugar.          - Diabetic Monofilament LE Exam  - POCT A1C  - POCT Retinal Eye Exam  - Basic Metabolic Panel; Future    4. Paroxysmal A-fib (HCC)  Stable condition.  Continue aspirin 81 Mg daily and metoprolol 25 mg daily.  Continue follow-up with cardiology service.    5. Essential hypertension  - MICROALBUMIN CREAT RATIO URINE; Future  - CBC WITHOUT DIFFERENTIAL; Future  Chronic stable.  Continue diltiazem to 40 Mg daily and metoprolol 25 mg daily.    6. Overweight  Chronic condition.  Advised diet and lifestyle modification.  Encouraged patient to maintain ideal weight.    7. Dyslipidemia  - ALANINE AMINO-TRANS; Future  - Lipid Profile; Future  Chronic condition.  Current status unclear.  Lab test ordered for follow-up.    8. Hyperproteinemia  Chronic condition.  Current status unclear.  Lab test ordered previously including SPEP.  Recommend follow-up after lab test done.        Other orders  - carbidopa-levodopa (SINEMET)  MG Tab; Take 1 Tablet by mouth every evening.  - Cholecalciferol 2000 UNIT Tab; Take 2,000 Units by mouth every day.  - gabapentin (NEURONTIN) 100 MG Cap; Take 100 mg by mouth.  - Multiple Vitamin (DAILY VITES) Tab; Take 1 Tablet by mouth every day.            Attestation: I spent:  34  minutes -    That includes time for chart review before the visit, the actual patient visit, and time spent on documentation in EMR after the visit.  Chart review/prep, review of other providers' records, imaging/lab review, face-to-face time for history/examination, pt's counseling/education, ordering, prescribing,  review of results/meds/ treatment plan with patient, and care coordination.           Healthcare Maintenance       Health Maintenance Due   Topic Date Due    HIV Screening  Never done    Hepatitis B Vaccine (Hep B) (1 of 3 -  19+ 3-dose series) Never done    Zoster (Shingles) Vaccines (1 of 2) Never done    COVID-19 Vaccine (1 - 2023-24 season) Never done    Diabetes: Retinopathy Screening  04/13/2024               Please note that this dictation was created using voice recognition software. I have made every reasonable attempt to correct obvious errors, but I expect that there are errors of grammar and possibly content that I did not discover before finalizing the note.    Ryan Garnica MD  Internal Medicine  [facility] care [facility]

## 2024-04-18 NOTE — ASSESSMENT & PLAN NOTE
Chronic condition.  Noted with chart review.  Patient asymptomatic.  Recommend lab test to be done for follow-up including SPEP

## 2024-04-18 NOTE — ASSESSMENT & PLAN NOTE
This is a chronic condition.  The patient is taking diltiazem and lisinopril as well as metoprolol.  Blood pressure has been well-controlled.  No significant side effects reported.

## 2024-04-18 NOTE — ASSESSMENT & PLAN NOTE
This is a chronic condition.  Patient has been followed by cardiology service.  Currently taking aspirin 81 Mg daily.  Diltiazem to 40 Mg daily and metoprolol 25 mg daily.

## 2024-04-18 NOTE — ASSESSMENT & PLAN NOTE
Chronic condition.  The patient presently taking metformin 500 mg p.o. twice daily.  The patient tolerating medication well.  Patient is due for lab test.

## 2024-04-18 NOTE — ASSESSMENT & PLAN NOTE
This is a new condition.  The patient reported large hemorrhoid causing intermittent bleeding.  The patient denies any recent change in his diet or medication.  Patient has been doing sitz bath and applying hemorrhoidal cream with some improvement.

## 2024-04-23 LAB — RETINAL SCREEN: NEGATIVE

## 2024-04-25 ENCOUNTER — HOSPITAL ENCOUNTER (OUTPATIENT)
Dept: LAB | Facility: MEDICAL CENTER | Age: 59
End: 2024-04-25
Attending: INTERNAL MEDICINE
Payer: COMMERCIAL

## 2024-04-25 DIAGNOSIS — E11.9 TYPE 2 DIABETES MELLITUS WITHOUT COMPLICATION, WITHOUT LONG-TERM CURRENT USE OF INSULIN (HCC): ICD-10-CM

## 2024-04-25 DIAGNOSIS — N28.9 RENAL INSUFFICIENCY: ICD-10-CM

## 2024-04-25 DIAGNOSIS — R74.8 LIVER ENZYME ELEVATION: ICD-10-CM

## 2024-04-25 DIAGNOSIS — R74.8 LIVER ENZYME ELEVATION: Chronic | ICD-10-CM

## 2024-04-25 DIAGNOSIS — E88.09 HYPERPROTEINEMIA: ICD-10-CM

## 2024-04-25 DIAGNOSIS — I10 ESSENTIAL HYPERTENSION: ICD-10-CM

## 2024-04-25 DIAGNOSIS — D64.9 ANEMIA, UNSPECIFIED TYPE: Chronic | ICD-10-CM

## 2024-04-25 DIAGNOSIS — E78.5 DYSLIPIDEMIA: ICD-10-CM

## 2024-04-25 LAB
ALBUMIN SERPL BCP-MCNC: 4.2 G/DL (ref 3.2–4.9)
ALP SERPL-CCNC: 69 U/L (ref 30–99)
ALT SERPL-CCNC: 33 U/L (ref 2–50)
ANION GAP SERPL CALC-SCNC: 15 MMOL/L (ref 7–16)
AST SERPL-CCNC: 48 U/L (ref 12–45)
BILIRUB CONJ SERPL-MCNC: <0.2 MG/DL (ref 0.1–0.5)
BILIRUB INDIRECT SERPL-MCNC: ABNORMAL MG/DL (ref 0–1)
BILIRUB SERPL-MCNC: 0.3 MG/DL (ref 0.1–1.5)
BUN SERPL-MCNC: 14 MG/DL (ref 8–22)
CALCIUM SERPL-MCNC: 9.5 MG/DL (ref 8.4–10.2)
CHLORIDE SERPL-SCNC: 103 MMOL/L (ref 96–112)
CHOLEST SERPL-MCNC: 227 MG/DL (ref 100–199)
CO2 SERPL-SCNC: 22 MMOL/L (ref 20–33)
CREAT SERPL-MCNC: 1.54 MG/DL (ref 0.5–1.4)
CREAT UR-MCNC: 157.81 MG/DL
ERYTHROCYTE [DISTWIDTH] IN BLOOD BY AUTOMATED COUNT: 51.4 FL (ref 35.9–50)
GFR SERPLBLD CREATININE-BSD FMLA CKD-EPI: 52 ML/MIN/1.73 M 2
GLUCOSE SERPL-MCNC: 106 MG/DL (ref 65–99)
HBV SURFACE AB SERPL IA-ACNC: <3.5 MIU/ML (ref 0–10)
HBV SURFACE AG SER QL: NORMAL
HCT VFR BLD AUTO: 35 % (ref 42–52)
HDLC SERPL-MCNC: 71 MG/DL
HGB BLD-MCNC: 11.8 G/DL (ref 14–18)
LDLC SERPL CALC-MCNC: 136 MG/DL
MCH RBC QN AUTO: 35 PG (ref 27–33)
MCHC RBC AUTO-ENTMCNC: 33.7 G/DL (ref 32.3–36.5)
MCV RBC AUTO: 103.9 FL (ref 81.4–97.8)
MICROALBUMIN UR-MCNC: 1.9 MG/DL
MICROALBUMIN/CREAT UR: 12 MG/G (ref 0–30)
PLATELET # BLD AUTO: 346 K/UL (ref 164–446)
PMV BLD AUTO: 9.3 FL (ref 9–12.9)
POTASSIUM SERPL-SCNC: 4.3 MMOL/L (ref 3.6–5.5)
PROT SERPL-MCNC: 8.3 G/DL (ref 6–8.2)
RBC # BLD AUTO: 3.37 M/UL (ref 4.7–6.1)
SODIUM SERPL-SCNC: 140 MMOL/L (ref 135–145)
TRIGL SERPL-MCNC: 102 MG/DL (ref 0–149)
WBC # BLD AUTO: 7.8 K/UL (ref 4.8–10.8)

## 2024-04-25 PROCEDURE — 82570 ASSAY OF URINE CREATININE: CPT

## 2024-04-25 PROCEDURE — 84165 PROTEIN E-PHORESIS SERUM: CPT

## 2024-04-25 PROCEDURE — 36415 COLL VENOUS BLD VENIPUNCTURE: CPT

## 2024-04-25 PROCEDURE — 85027 COMPLETE CBC AUTOMATED: CPT

## 2024-04-25 PROCEDURE — 80076 HEPATIC FUNCTION PANEL: CPT

## 2024-04-25 PROCEDURE — 86706 HEP B SURFACE ANTIBODY: CPT

## 2024-04-25 PROCEDURE — 80048 BASIC METABOLIC PNL TOTAL CA: CPT

## 2024-04-25 PROCEDURE — 84155 ASSAY OF PROTEIN SERUM: CPT

## 2024-04-25 PROCEDURE — 87340 HEPATITIS B SURFACE AG IA: CPT

## 2024-04-25 PROCEDURE — 82043 UR ALBUMIN QUANTITATIVE: CPT

## 2024-04-25 PROCEDURE — 80061 LIPID PANEL: CPT

## 2024-04-26 DIAGNOSIS — D64.9 ANEMIA, UNSPECIFIED TYPE: Chronic | ICD-10-CM

## 2024-04-29 LAB
ALBUMIN SERPL ELPH-MCNC: 4.24 G/DL (ref 3.75–5.01)
ALPHA1 GLOB SERPL ELPH-MCNC: 0.3 G/DL (ref 0.19–0.46)
ALPHA2 GLOB SERPL ELPH-MCNC: 1.01 G/DL (ref 0.48–1.05)
B-GLOBULIN SERPL ELPH-MCNC: 1.13 G/DL (ref 0.48–1.1)
EER PROT ELECT SER Q1092: ABNORMAL
GAMMA GLOB SERPL ELPH-MCNC: 1.12 G/DL (ref 0.62–1.51)
INTERPRETATION SERPL IFE-IMP: ABNORMAL
MONOCLONAL PROTEIN NL11656: ABNORMAL G/DL
PROT SERPL-MCNC: 7.8 G/DL (ref 6.3–8.2)

## 2024-05-15 ENCOUNTER — APPOINTMENT (OUTPATIENT)
Dept: MEDICAL GROUP | Facility: PHYSICIAN GROUP | Age: 59
End: 2024-05-15
Payer: COMMERCIAL

## 2024-06-13 ENCOUNTER — APPOINTMENT (OUTPATIENT)
Dept: MEDICAL GROUP | Facility: PHYSICIAN GROUP | Age: 59
End: 2024-06-13
Payer: COMMERCIAL

## 2024-07-03 RX ORDER — PIOGLITAZONEHYDROCHLORIDE 30 MG/1
30 TABLET ORAL DAILY
Qty: 90 TABLET | Refills: 3 | Status: SHIPPED | OUTPATIENT
Start: 2024-07-03

## 2024-07-03 RX ORDER — ATORVASTATIN CALCIUM 20 MG/1
20 TABLET, FILM COATED ORAL DAILY
Qty: 90 TABLET | Refills: 3 | Status: SHIPPED | OUTPATIENT
Start: 2024-07-03

## 2024-10-28 RX ORDER — DILTIAZEM HYDROCHLORIDE 240 MG/1
240 CAPSULE, COATED, EXTENDED RELEASE ORAL DAILY
Qty: 90 CAPSULE | Refills: 0 | Status: SHIPPED | OUTPATIENT
Start: 2024-10-28

## 2025-01-27 RX ORDER — DILTIAZEM HYDROCHLORIDE 240 MG/1
240 CAPSULE, COATED, EXTENDED RELEASE ORAL DAILY
Qty: 90 CAPSULE | Refills: 1 | Status: SHIPPED | OUTPATIENT
Start: 2025-01-27

## 2025-01-27 NOTE — TELEPHONE ENCOUNTER
Received request via: Pharmacy    Was the patient seen in the last year in this department? Yes    Does the patient have an active prescription (recently filled or refills available) for medication(s) requested? No    Pharmacy Name:     [facility] [id number]  [facility] [place] - [place], [place] - [address]  [address] [place] [address]  Phone: [phone] Fax: [phone]        Does the patient have half-way Plus and need 100-day supply? (This applies to ALL medications) Patient does not have SCP